# Patient Record
Sex: FEMALE | Race: WHITE
[De-identification: names, ages, dates, MRNs, and addresses within clinical notes are randomized per-mention and may not be internally consistent; named-entity substitution may affect disease eponyms.]

---

## 2018-03-19 ENCOUNTER — HOSPITAL ENCOUNTER (INPATIENT)
Dept: HOSPITAL 80 - FED | Age: 48
Discharge: HOME | DRG: 343 | End: 2018-03-19
Attending: SURGERY | Admitting: SURGERY
Payer: COMMERCIAL

## 2018-03-19 VITALS
SYSTOLIC BLOOD PRESSURE: 106 MMHG | OXYGEN SATURATION: 91 % | RESPIRATION RATE: 12 BRPM | DIASTOLIC BLOOD PRESSURE: 70 MMHG

## 2018-03-19 VITALS — HEART RATE: 52 BPM | TEMPERATURE: 97.3 F

## 2018-03-19 DIAGNOSIS — K35.80: Primary | ICD-10-CM

## 2018-03-19 LAB
INR PPP: 0.93 (ref 0.83–1.16)
PLATELET # BLD: 226 10^3/UL (ref 150–400)
PROTHROMBIN TIME: 12.7 SEC (ref 12–15)

## 2018-03-19 PROCEDURE — 0DTJ4ZZ RESECTION OF APPENDIX, PERCUTANEOUS ENDOSCOPIC APPROACH: ICD-10-PCS | Performed by: SURGERY

## 2018-03-19 NOTE — GHP
[f rep st]



                                                            HISTORY AND PHYSICAL





DATE OF ADMISSION:  2018



CHIEF COMPLAINT:  Acute appendicitis.



HISTORY OF PRESENT ILLNESS:  The patient is a 47-year-old woman, who started having abdominal pain on
 Friday.  The abdominal pain persisted through the weekend.  She is training for a 100-mile run and c
ontinued to have pain.  It localized to the right lower quadrant.  She presented to the ER and had a 
CT scan performed of her abdomen and pelvis.  The CT scan showed acute appendicitis.



PAST MEDICAL HISTORY:  None.



PAST SURGICAL HISTORY:  .



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  She denies tobacco use.



FAMILY HISTORY:  Noncontributory.



REVIEW OF SYSTEMS:  10-point review of systems negative except per HPI.



PHYSICAL EXAMINATION:  VITAL SIGNS:  37, 61, 110/67, 16, 96% on 2 L.  GENERAL:  Pleasant, well-nouris
hed, well-groomed woman, lying on gurney.   at bedside.  HEENT:  Normocephalic.  No gross hear
ing deficits.  Mucous membranes moist.  Pupils equal and round.  No scleral icterus.  LUNGS:  Clear t
o auscultation bilaterally.  No increased work of breathing.  CARDIAC:  Regular rate.  ABDOMEN:  Herb
l sounds present.  She is soft.  She is tender in the right lower quadrant.  She has a positive Rovsi
ng sign.  SKIN:  Warm and dry.  PSYCH:  Mood and affect normal.  NEURO:  Grossly intact.



LABORATORY DATA:  Results reviewed.  I personally reviewed the results of her CT scan.  See the acute
 appendicitis.  Her white count is slightly elevated at 9.68.



IMPRESSION AND PLAN:  The patient is a 47-year-old woman with acute appendicitis.  I will take her to
 the operating room for a laparoscopic appendectomy.  The risks and benefits, including, but not limi
gail to, stroke, heart attack, death, blood clots, infection, bleeding, damage to surrounding structur
es were all discussed.  She had her questions answered to her satisfaction and signed the informed co
nsent.  She has received Invanz in the emergency room.





Job #:  [id number]

## 2018-03-19 NOTE — PDANEPAE
ANE Past Medical History





- Pulmonary History


Hx Oxygen in Use at Home: No


Hx Sleep Apnea: No





- Endocrine History


Hx Diabetes: No





ANE Review of Systems


Review of Systems: 








ANE Patient History





- Allergies


Allergies/Adverse Reactions: 








No Known Allergies Allergy (Verified 03/19/18 00:20)


 








- Home Medications


Home Medications: 








NK [No Known Home Meds]  02/01/15 [Last Taken Unknown]








- NPO status


NPO Since - Liquids (Date): 03/18/18


NPO Since - Liquids (Time): 23:45


NPO Since - Solids (Date): 03/18/18


NPO Since - Solids (Time): 20:15





- Smoking Hx


Smoking Status: Never smoked





ANE Labs/Vital Signs





- Labs


Result Diagrams: 


 03/19/18 00:48





 03/19/18 00:48





- Vital Signs


Blood Pressure: 109/73


Heart Rate: 54


Respiratory Rate: 16


O2 Sat (%): 94


Height: 160.02 cm


Weight: 55.792 kg





ANE Physical Exam





- Airway


Neck exam: FROM


Mallampati Score: Class 2


Mouth exam: normal dental/mouth exam





- Pulmonary


Pulmonary: no respiratory distress





- Cardiovascular


Cardiovascular: regular rate and rhythym





- ASA Status


ASA Status: I, E





ANE Anesthesia Plan


Anesthesia Plan: general endotracheal anesthesia

## 2018-03-19 NOTE — EDPHY
H & P


Stated Complaint: RLQ pain,


Time Seen by Provider: 18 00:26


HPI/ROS: 





HPI





CHIEF COMPLAINT:  Abdominal pain, right lower quadrant





HISTORY OF PRESENT ILLNESS:  Patient is a 47-year-old female she is otherwise 

healthy without any significant medical history she has had a  before 

but otherwise no abdominal surgeries, she presents emergency room with right 

lower quadrant abdominal pain.  Patient reports to me that on Friday she 

developed some generalized abdominal pain or abdominal discomfort very mild it 

persisted through Saturday and then today.  She noticed while running today she 

continued to have pain.  Pain is located right lower quadrant.  She has had no 

nausea or fever.  Denies diarrhea.  She did have a decreased appetite.  She 

denies any chest pain or pleuritic pain.  Denies shortness of breath.  Pain is 

located 6/10 right lower quadrant.





Past Medical History:  Denies medical history





Past Surgical History:  





Social History:  Denies drugs alcohol tobacco.





Family History:  Noncontributory








ROS   


REVIEW OF SYSTEMS:


A comprehensive 10 point review of systems is otherwise negative aside from 

elements mentioned in the history of present illness.








Exam   


Constitutional  appears well nontoxic no acute distress, triage nursing summary 

reviewed, vital signs reviewed, awake/alert. 


Eyes   normal conjunctivae and sclera, EOMI, PERRLA. 


HENT   normal inspection, atraumatic, moist mucus membranes, no epistaxis, neck 

supple/ no meningismus, no raccoon eyes. 


Respiratory   clear to auscultation bilaterally, normal breath sounds, no 

respiratory distress, no wheezing. 


Cardiovascular   rate normal, regular rhythm, no murmur, no edema, distal 

pulses normal. 


Gastrointestinal   mild tender palpation right lower quadrant, no rebound, no 

guarding, normal bowel sounds, no distension, no pulsatile mass. 


Genitourinary   no CVA tenderness. 


Musculoskeletal  no midline vertebral tenderness, full range of motion, no calf 

swelling, no tenderness of extremities, no meningismus, good pulses, 

neurovascularly intact.


Skin   pink, warm, & dry, no rash, skin atraumatic. 


Neurologic   awake, alert and oriented x 3, AAOx3, moves all 4 extremities 

equally, motor intact, sensory intact, CN II-XII intact, normal cerebellar, 

normal vision, normal speech. 


Psychiatric   normal mood/affect. 


Heme/Lymph/Immune   no lymphadenopathy.





Differential diagnosis includes but is not limited to and in no particular order

:  Bowel obstruction, appendicitis, gallbladder disease, diverticulitis, colitis

, enteritis, perforated viscus, gastritis, GERD, esophagitis, urinary tract 

infection, pyelonephritis, kidney stones





Medical Decision Making:  Plan for this patient IV establishment IV fluid bolus 

1 L normal saline, IV Dilaudid for pain control IV Zofran nausea, check basic 

blood work, pregnancy test, urinalysis, CT scan abdomen pelvis with IV contrast 

rule out acute appendicitis.  It appendix is normal may need to further 

evaluate right ovary.





Re-evaluation:








CT scan abdomen pelvis with IV contrast:  This shows acute appendicitis.  

Called to me by Dr. Burt. 





Updated patient about appendicitis on CT.





IV Invanz ordered.





Patient agrees for admission.  And surgeries been consult.








0258:  Dr. Stone has been consulted for Acute Appy. 


Source: Patient





- Personal History


LMP (Females 10-55): 22-28 Days Ago


Current Tetanus/Diphtheria Vaccine: Yes





- Medical/Surgical History


Hx Asthma: No


Hx Chronic Respiratory Disease: No


Hx Diabetes: No


Hx Cardiac Disease: No


Hx Renal Disease: No


Hx Cirrhosis: No


Hx Alcoholism: No


Hx HIV/AIDS: No


Hx Splenectomy or Spleen Trauma: No


Other PMH: c-sec, knee surgeries





- Social History


Smoking Status: Never smoked


Constitutional: 


 Initial Vital Signs











Temperature (C)  36.5 C   18 00:18


 


Heart Rate  70   18 00:18


 


Respiratory Rate  16   18 00:18


 


Blood Pressure  117/72   18 00:18


 


O2 Sat (%)  96   18 00:18








 











O2 Delivery Mode               Nasal Cannula


 


O2 (L/minute)                  2














Allergies/Adverse Reactions: 


 





No Known Allergies Allergy (Verified 18 00:20)


 








Home Medications: 














 Medication  Instructions  Recorded


 


NK [No Known Home Meds]  02/01/15














Medical Decision Making





- Data Points


Laboratory Results: 


 Laboratory Results





 18 00:48 





 18 00:48 





 











  18





  00:48 00:48 00:48


 


WBC      





    


 


RBC      





    


 


Hgb      





    


 


Hct      





    


 


MCV      





    


 


MCH      





    


 


MCHC      





    


 


RDW      





    


 


Plt Count      





    


 


MPV      





    


 


Neut % (Auto)      





    


 


Lymph % (Auto)      





    


 


Mono % (Auto)      





    


 


Eos % (Auto)      





    


 


Baso % (Auto)      





    


 


Nucleat RBC Rel Count      





    


 


Absolute Neuts (auto)      





    


 


Absolute Lymphs (auto)      





    


 


Absolute Monos (auto)      





    


 


Absolute Eos (auto)      





    


 


Absolute Basos (auto)      





    


 


Absolute Nucleated RBC      





    


 


Immature Gran %      





    


 


Immature Gran #      





    


 


PT      12.7 SEC SEC





     (12.0-15.0) 


 


INR      0.93 





     (0.83-1.16) 


 


APTT      25.8 SEC SEC





     (23.0-38.0) 


 


Sodium    145 mEq/L mEq/L  





    (135-145)  


 


Potassium    3.9 mEq/L mEq/L  





    (3.5-5.2)  


 


Chloride    106 mEq/L mEq/L  





    ()  


 


Carbon Dioxide    26 mEq/l mEq/l  





    (22-31)  


 


Anion Gap    13 mEq/L mEq/L  





    (8-16)  


 


BUN    21 mg/dL mg/dL  





    (7-23)  


 


Creatinine    0.8 mg/dL mg/dL  





    (0.6-1.0)  


 


Estimated GFR    > 60   





    


 


Glucose    88 mg/dL mg/dL  





    ()  


 


Calcium    9.4 mg/dL mg/dL  





    (8.5-10.4)  


 


Total Bilirubin    0.3 mg/dL mg/dL  





    (0.1-1.4)  


 


Conjugated Bilirubin    0.2 mg/dL mg/dL  





    (0.0-0.5)  


 


Unconjugated Bilirubin    0.1 mg/dL mg/dL  





    (0.0-1.1)  


 


AST    36 IU/L IU/L  





    (14-46)  


 


ALT    31 IU/L IU/L  





    (9-52)  


 


Alkaline Phosphatase    54 IU/L IU/L  





    ()  


 


Total Protein    7.8 g/dL g/dL  





    (6.3-8.2)  


 


Albumin    4.4 g/dL g/dL  





    (3.5-5.0)  


 


Lipase    265 IU/L IU/L  





    ()  


 


Beta HCG, Qual  NEGATIVE     





    














  18





  00:48


 


WBC  9.68 10^3/uL H 10^3/uL





   (3.80-9.50) 


 


RBC  4.57 10^6/uL 10^6/uL





   (4.18-5.33) 


 


Hgb  13.8 g/dL g/dL





   (12.6-16.3) 


 


Hct  40.8 % %





   (38.0-47.0) 


 


MCV  89.3 fL fL





   (81.5-99.8) 


 


MCH  30.2 pg pg





   (27.9-34.1) 


 


MCHC  33.8 g/dL g/dL





   (32.4-36.7) 


 


RDW  13.1 % %





   (11.5-15.2) 


 


Plt Count  226 10^3/uL 10^3/uL





   (150-400) 


 


MPV  9.5 fL fL





   (8.7-11.7) 


 


Neut % (Auto)  72.4 % %





   (39.3-74.2) 


 


Lymph % (Auto)  19.3 % %





   (15.0-45.0) 


 


Mono % (Auto)  6.1 % %





   (4.5-13.0) 


 


Eos % (Auto)  1.4 % %





   (0.6-7.6) 


 


Baso % (Auto)  0.5 % %





   (0.3-1.7) 


 


Nucleat RBC Rel Count  0.0 % %





   (0.0-0.2) 


 


Absolute Neuts (auto)  7.00 10^3/uL H 10^3/uL





   (1.70-6.50) 


 


Absolute Lymphs (auto)  1.87 10^3/uL 10^3/uL





   (1.00-3.00) 


 


Absolute Monos (auto)  0.59 10^3/uL 10^3/uL





   (0.30-0.80) 


 


Absolute Eos (auto)  0.14 10^3/uL 10^3/uL





   (0.03-0.40) 


 


Absolute Basos (auto)  0.05 10^3/uL 10^3/uL





   (0.02-0.10) 


 


Absolute Nucleated RBC  0.00 10^3/uL 10^3/uL





   (0-0.01) 


 


Immature Gran %  0.3 % %





   (0.0-1.1) 


 


Immature Gran #  0.03 10^3/uL 10^3/uL





   (0.00-0.10) 


 


PT  





  


 


INR  





  


 


APTT  





  


 


Sodium  





  


 


Potassium  





  


 


Chloride  





  


 


Carbon Dioxide  





  


 


Anion Gap  





  


 


BUN  





  


 


Creatinine  





  


 


Estimated GFR  





  


 


Glucose  





  


 


Calcium  





  


 


Total Bilirubin  





  


 


Conjugated Bilirubin  





  


 


Unconjugated Bilirubin  





  


 


AST  





  


 


ALT  





  


 


Alkaline Phosphatase  





  


 


Total Protein  





  


 


Albumin  





  


 


Lipase  





  


 


Beta HCG, Qual  





  











Medications Given: 


 








Discontinued Medications





Ertapenem (Invanz)  1 gm IV EDNOW ONE


   PRN Reason: Protocol


   Stop: 18 02:29


   Last Admin: 18 02:57 Dose:  1 gm


Hydromorphone HCl (Dilaudid)  0.5 mg IVP EDNOW ONE


   Stop: 18 00:42


   Last Admin: 18 01:17 Dose:  0.5 mg


Sodium Chloride (Ns)  1,000 mls @ 0 mls/hr IV EDNOW ONE; Wide Open


   PRN Reason: Protocol


   Stop: 18 00:32


   Last Admin: 18 00:45 Dose:  1,000 mls


Ondansetron HCl (Zofran)  4 mg IVP EDNOW ONE


   Stop: 18 00:43


   Last Admin: 18 01:17 Dose:  4 mg








Departure





- Departure


Disposition: [facility]


Clinical Impression: 


Acute appendicitis


Qualifiers:


 Acute appendicitis type: with localized peritonitis Qualified Code(s): K35.3 - 

Acute appendicitis with localized peritonitis





Condition: Fair

## 2018-03-19 NOTE — POSTOPPROG
Post Op Note


Date of Operation: 03/19/18


Surgeon: Rylee Stone


Anesthesiologist: paulino


Anesthesia: GET(General Endotracheal)


Pre-op Diagnosis: acute appendicitis


Post-op Diagnosis: same


Indication: 47 year old with acute appendicitis


Procedure: lap appy


Findings: inflamed appendix


Inf/Abcess present in the surg proc area at time of surgery?: No


EBL: Minimal


Specimen(s): 





appendix

## 2018-03-19 NOTE — GOP
[f rep st]



                                                                OPERATIVE REPORT





DATE OF OPERATION:  03/19/2018



SURGEON:  Rylee Stone MD



ANESTHESIA:  General.



ANESTHESIOLOGIST:  Antonio Orozco MD



PREOPERATIVE DIAGNOSIS:  Acute appendicitis.



POSTOPERATIVE DIAGNOSIS:  Acute appendicitis.



PROCEDURE PERFORMED:  Laparoscopic appendectomy.



FINDINGS:  Very inflamed appendix with thickened mesentery.



SPECIMENS:  Appendix.



ESTIMATED BLOOD LOSS:  10 cc.



INDICATIONS:  The patient is a 47-year-old woman who presented with 48 hours of abdominal pain.  CT s
can confirmed appendicitis.



DESCRIPTION OF PROCEDURE:  Patient was brought into the operating room, placed supine on the table, a
nd general anesthesia was administered.  Her abdomen was prepped and draped in the usual sterile fash
ion.  I infiltrated all sites with 0.5% Marcaine prior to making incisions.  I elevated her umbilicus
 and made a small incision.  I inserted the Veress needle.  It passed the hanging drop test.  Her abd
omen insufflated easily to a pressure of 15 mmHg.  I placed a 5 mm trocar with a camera at this site.
  There were no injuries from Veress needle placement.  Under direct vision I placed a 5 mm suprapubi
c trocar and a 10 mm trocar in the left lower quadrant.  I explored her abdomen.  Her appendix was in
flamed.  There were no other abnormalities noted.  I gently dissected the appendix away from the side
wall.  I lifted it and divided the mesoappendix with the Harmonic Scalpel.  It was thicker than usual
.  I used the Endo AHMET 45 white load to divide the base of the appendix.  I placed the appendix in an
 EndoCatch bag and retrieved it via the 10 mm trocar.  Hemostasis was achieved at the staple line.  I
 examined her abdomen no injuries noted.  The ports were removed under direct vision.  The abdomen al
lowed to desufflate.  I closed the fascia at the 10 mm trocar site with 0 Vicryl.  I closed the skin 
with 4-0 Monocryl.  Dermabond applied.  She was awakened in the operating room, extubated, transferre
d to PACU in stable condition.





Job #:  [id number]